# Patient Record
(demographics unavailable — no encounter records)

---

## 2025-04-29 NOTE — HISTORY OF PRESENT ILLNESS
[FreeTextEntry1] : A 66 years old  female patient with history of dyslipidemia, strong family of premature coronary disease with a brother having acute MI at age 44, father having 2 MI before age 50; family history of breast cancer, osteoporosis referred for cardiac evaluation for shortness of breath and family history.  Patient herself is very active, she does get short of breath on more than usual exertion but no chest pain.  She denies PND, orthopnea, diaphoresis, dizziness, palpitations, pedal edema.  She has all the symptoms of sleep apnea syndrome including habitual snoring, daytime fatigue/somnolence, witnessed apnea episodes.  No prior history of CHF, MI, syncope  February 25, 2025    CMP normal, , TSH 2.25, CBC normal

## 2025-04-29 NOTE — ASSESSMENT
[FreeTextEntry1] : Shortness of breath, multiple CAD risk factors including strong family of premature coronary disease -I recommend echocardiography for LV size, LV thickness, and wall motion, LVEF, valvular morphology.  I also recommend exercise nuclear stress test to see inducible symptoms and CAD evaluation.  Decreased carotid upstroke -carotid Doppler for carotid stenosis  Dyslipidemia -low-cholesterol diet has been discussed with  Suspect sleep apnea syndrome -sleep that has been recommended  Aggressive risk factor modifier has been discussed with patient in great length.  She will be reeval by me after cardiac testing.

## 2025-06-04 NOTE — HISTORY OF PRESENT ILLNESS
[FreeTextEntry1] : A 66 years old  female patient with history of dyslipidemia, strong family of premature coronary disease with a brother having acute MI at age 44, father having 2 MI before age 50; family history of breast cancer, osteoporosis referred for cardiac evaluation for shortness of breath and family history.  Patient is here for cardiac testing follow-up. May 28, 2025    exercise nuclear stress test was done at [facility] protocol; she exercised for 10 minutes with peak heart rate 134 bpm, peak blood pressure 160/84 mmHg, no symptoms, exercise Duke treadmill score 10 which is low risk score.  Normal myocardial perfusion scan. May 22, 2025   echo confirmed normal LV size, LV thickness, and wall motion, LV 60% without signal valvular abnormality. May 7, 2025     sleep study did not confirm significant sleep apnea syndrome April 29, 2025   carotid Doppler showed mild nonobstructive plaquing without hemodynamically significant, stenosis bilaterally in the bulbs with normal antegrade vertebral arterial flow.  Patient herself is very active, she does get short of breath on more than usual exertion but no chest pain.  She denies PND, orthopnea, diaphoresis, dizziness, palpitations, pedal edema.  She has all the symptoms of sleep apnea syndrome including habitual snoring, daytime fatigue/somnolence, witnessed apnea episodes.  No prior history of CHF, MI, syncope  February 25, 2025    CMP normal, , TSH 2.25, CBC normal

## 2025-06-04 NOTE — ASSESSMENT
[FreeTextEntry1] : Shortness of breath, multiple CAD risk factors including strong family of premature coronary disease -echo confirmed normal LV size, LV thickness, and wall motion, LVEF, valvular morphology.  Exercise nuclear stress did not show inducible ischemia at high workload within a low risk exercise Kenny treadmill score  Decreased carotid upstroke -carotid Doppler did not confirm mild nonobstructive carotid stenosis  Dyslipidemia -low-cholesterol diet has been discussed with her . Start Crestor 10 mg daily, lipid panel in 6 weeks  Suspected sleep apnea syndrome -sleep study did not confirm significant sleep apnea syndrome  Aggressive risk factor modifier has been discussed with patient in great length.  She will be reeval by me i 6 months.